# Patient Record
Sex: FEMALE | Race: WHITE | NOT HISPANIC OR LATINO | URBAN - METROPOLITAN AREA
[De-identification: names, ages, dates, MRNs, and addresses within clinical notes are randomized per-mention and may not be internally consistent; named-entity substitution may affect disease eponyms.]

---

## 2017-06-15 ENCOUNTER — APPOINTMENT (RX ONLY)
Dept: URBAN - METROPOLITAN AREA CLINIC 156 | Facility: CLINIC | Age: 62
Setting detail: DERMATOLOGY
End: 2017-06-15

## 2017-06-15 DIAGNOSIS — Z41.9 ENCOUNTER FOR PROCEDURE FOR PURPOSES OTHER THAN REMEDYING HEALTH STATE, UNSPECIFIED: ICD-10-CM

## 2017-06-15 PROCEDURE — ? BOTOX

## 2017-06-15 PROCEDURE — ? FILLERS

## 2017-06-15 NOTE — PROCEDURE: BOTOX
Levator Labii Superioris Units: 0
Reconstitution Date (Optional): 6/15/17
Glabellar Complex Units: 20
Expiration Date (Month Year): 9-18
Detail Level: Simple
Dilution (U/0.1 Cc): 2.5
Post-Care Instructions: Patient instructed to not lie down for 4 hours and limit physical activity for 24 hours. Patient instructed not to travel by airplane for 48 hours.
Forehead Units: 10
Lot #: [id number]
Consent: Written consent obtained. Risks include but not limited to lid/brow ptosis, bruising, swelling, diplopia, temporary effect, incomplete chemical denervation.

## 2017-06-15 NOTE — PROCEDURE: FILLERS
Temple Hollows Filler  Volume In Cc: 0
Post-Care Instructions: Patient instructed to apply ice to reduce swelling.
Topical Anesthesia?: 23% lidocaine, 7% tetracaine
Filler: Juvederm Vollure XC
Lot #: [id number]
Cheeks Filler Volume In Cc: 1
Nasolabial Folds Filler Volume In Cc: 0.5
Consent: Written consent obtained. Risks include but not limited to bruising, beading, irregular texture, ulceration, infection, allergic reaction, scar formation, incomplete augmentation, temporary nature, procedural pain.
Filler: Juvederm Voluma XC
Detail Level: Simple
Expiration Date (Month Year): 06/18
Expiration Date (Month Year): 01/19
Use Map Statement For Sites (Optional): No
Map Statment: See Attach Map for Complete Details
Lot #: [id number]

## 2017-06-16 ENCOUNTER — RX ONLY (OUTPATIENT)
Age: 62
Setting detail: RX ONLY
End: 2017-06-16

## 2017-06-16 RX ORDER — VALACYCLOVIR HYDROCHLORIDE 1 G/1
TABLET, FILM COATED ORAL
Qty: 4 | Refills: 0 | Status: ERX | COMMUNITY
Start: 2017-06-16

## 2017-06-22 ENCOUNTER — APPOINTMENT (RX ONLY)
Dept: URBAN - METROPOLITAN AREA CLINIC 156 | Facility: CLINIC | Age: 62
Setting detail: DERMATOLOGY
End: 2017-06-22

## 2017-06-22 DIAGNOSIS — Z41.9 ENCOUNTER FOR PROCEDURE FOR PURPOSES OTHER THAN REMEDYING HEALTH STATE, UNSPECIFIED: ICD-10-CM

## 2017-06-22 PROCEDURE — ? PELLEVE

## 2017-06-22 ASSESSMENT — LOCATION ZONE DERM: LOCATION ZONE: FACE

## 2017-06-22 ASSESSMENT — LOCATION SIMPLE DESCRIPTION DERM
LOCATION SIMPLE: RIGHT CHEEK
LOCATION SIMPLE: LEFT CHEEK

## 2017-06-22 ASSESSMENT — LOCATION DETAILED DESCRIPTION DERM
LOCATION DETAILED: LEFT MEDIAL MALAR CHEEK
LOCATION DETAILED: RIGHT MEDIAL MALAR CHEEK

## 2017-06-22 NOTE — PROCEDURE: PELLEVE
Consent: Written consent obtained, risks reviewed including but not limited to crusting, scabbing, blistering, scarring, darker or lighter pigmentary change, and/or incomplete removal.
Post-Procedures Photographs: Yes
Immediate Post-Procedure Findings: mild erythema, no edema
Pelleve Tip: 10mm
Treatment Number: 1
Number Of Passes: 5
Detail Level: Zone
Post-Care Instructions: I reviewed with the patient in detail post-care instructions. Patient should stay away from the sun and wear sun protection until treated areas are fully healed.
Treatment Endpoint: skin Smoothing
Generator Setting: 15
Post-Procedure Text: Vaseline and ice applied. Post care reviewed with patient.
Patient Discomfort: no

## 2017-07-26 ENCOUNTER — APPOINTMENT (RX ONLY)
Dept: URBAN - METROPOLITAN AREA CLINIC 156 | Facility: CLINIC | Age: 62
Setting detail: DERMATOLOGY
End: 2017-07-26

## 2017-07-26 DIAGNOSIS — Z41.9 ENCOUNTER FOR PROCEDURE FOR PURPOSES OTHER THAN REMEDYING HEALTH STATE, UNSPECIFIED: ICD-10-CM

## 2017-07-26 PROCEDURE — ? FILLERS

## 2018-04-05 ENCOUNTER — APPOINTMENT (RX ONLY)
Dept: URBAN - METROPOLITAN AREA CLINIC 156 | Facility: CLINIC | Age: 63
Setting detail: DERMATOLOGY
End: 2018-04-05

## 2018-04-05 DIAGNOSIS — Z41.9 ENCOUNTER FOR PROCEDURE FOR PURPOSES OTHER THAN REMEDYING HEALTH STATE, UNSPECIFIED: ICD-10-CM

## 2018-04-05 PROCEDURE — ? BOTOX

## 2018-04-05 NOTE — PROCEDURE: BOTOX
Depressor Anguli Oris Units: 0
Consent: Written consent obtained. Risks include but not limited to lid/brow ptosis, bruising, swelling, diplopia, temporary effect, incomplete chemical denervation.
Post-Care Instructions: Patient instructed to not lie down for 4 hours and limit physical activity for 24 hours. Patient instructed not to travel by airplane for 48 hours.
Lot #: [id number]
Expiration Date (Month Year): 8-20
Detail Level: Simple
Reconstitution Date (Optional): 4/2/18
Dilution (U/0.1 Cc): 2.5
Glabellar Complex Units: 25

## 2018-05-07 ENCOUNTER — APPOINTMENT (RX ONLY)
Dept: URBAN - METROPOLITAN AREA CLINIC 154 | Facility: CLINIC | Age: 63
Setting detail: DERMATOLOGY
End: 2018-05-07

## 2018-05-07 DIAGNOSIS — Z41.9 ENCOUNTER FOR PROCEDURE FOR PURPOSES OTHER THAN REMEDYING HEALTH STATE, UNSPECIFIED: ICD-10-CM

## 2018-05-07 PROCEDURE — ? FILLERS

## 2018-05-07 NOTE — PROCEDURE: FILLERS
Jawline Filler Volume In Cc: 0
Expiration Date (Month Year): 12/19
Additional Area 1 Volume In Cc: 0.5
Lot #: [id number]
Detail Level: Simple
Include Cannula Information In Note?: No
Map Statment: See Attach Map for Complete Details
Topical Anesthesia?: 23% lidocaine, 7% tetracaine
Temple Hollows Filler  Volume In Cc: 1
Filler: Bellafill
Additional Area 1 Location: mental is
Consent: Written consent obtained. Risks include but not limited to bruising, beading, irregular texture, ulceration, infection, allergic reaction, scar formation, incomplete augmentation, temporary nature, procedural pain.
Anesthesia Type: 1% lidocaine with epinephrine
Post-Care Instructions: Patient instructed to apply ice to reduce swelling.

## 2018-06-01 ENCOUNTER — APPOINTMENT (RX ONLY)
Dept: URBAN - METROPOLITAN AREA CLINIC 156 | Facility: CLINIC | Age: 63
Setting detail: DERMATOLOGY
End: 2018-06-01

## 2018-06-01 DIAGNOSIS — Z41.9 ENCOUNTER FOR PROCEDURE FOR PURPOSES OTHER THAN REMEDYING HEALTH STATE, UNSPECIFIED: ICD-10-CM

## 2018-06-01 PROCEDURE — ? FILLERS

## 2018-06-01 NOTE — PROCEDURE: FILLERS
Vermilion Lips Filler Volume In Cc: 0
Detail Level: Simple
Anesthesia Type: 1% lidocaine with epinephrine
Include Cannula Information In Note?: No
Filler: Bellafill
Topical Anesthesia?: 23% lidocaine, 7% tetracaine
Post-Care Instructions: Patient instructed to apply ice to reduce swelling.
Lot #: [id number]
Consent: Written consent obtained. Risks include but not limited to bruising, beading, irregular texture, ulceration, infection, allergic reaction, scar formation, incomplete augmentation, temporary nature, procedural pain.
Expiration Date (Month Year): 05/19
Map Statment: See Attach Map for Complete Details
Jawline Filler Volume In Cc: 1
Anesthesia Volume In Cc: 0.5

## 2018-06-14 ENCOUNTER — APPOINTMENT (RX ONLY)
Dept: URBAN - METROPOLITAN AREA CLINIC 154 | Facility: CLINIC | Age: 63
Setting detail: DERMATOLOGY
End: 2018-06-14

## 2018-06-14 DIAGNOSIS — Z41.9 ENCOUNTER FOR PROCEDURE FOR PURPOSES OTHER THAN REMEDYING HEALTH STATE, UNSPECIFIED: ICD-10-CM

## 2018-06-14 PROCEDURE — ? DYSPORT

## 2018-06-14 PROCEDURE — ? FILLERS

## 2018-06-14 NOTE — PROCEDURE: DYSPORT
Depressor Anguli Oris Units: 0
Post-Care Instructions: Patient instructed to not lie down for 4 hours and limit physical activity for 24 hours. Patient instructed not to travel by airplane for 48 hours.
Detail Level: Simple
Reconstitution Date (Optional): 6/12/18
Expiration Date (Month Year): 8/2018
Inferior Lateral Orbicularis Oculi Units: 25
Dilution (U/ 0.1cc): 7.5
Glabellar Complex Units: 75
Lot #: [id number]
Consent: Written consent obtained. Risks include but not limited to lid/brow ptosis, bruising, swelling, diplopia, temporary effect, incomplete chemical denervation.

## 2018-06-14 NOTE — PROCEDURE: FILLERS
Additional Area 1 Volume In Cc: 0
Expiration Date (Month Year): 08/19
Temple Hollows Filler  Volume In Cc: 1
Jawline Filler Volume In Cc: 2
Detail Level: Simple
Lot #: [id number]
Filler: Juvederm Volbella XC
Topical Anesthesia?: 23% lidocaine, 7% tetracaine
Lot #: [id number]
Include Cannula Information In Note?: No
Post-Care Instructions: Patient instructed to apply ice to reduce swelling.
Filler: Bellafill
Consent: Written consent obtained. Risks include but not limited to bruising, beading, irregular texture, ulceration, infection, allergic reaction, scar formation, incomplete augmentation, temporary nature, procedural pain.
Vermilion Lips Filler Volume In Cc: 0.6
Expiration Date (Month Year): 12/18
Additional Area 1 Location: 0.6 cc  injected into various acne scars
Map Statment: See Attach Map for Complete Details
Use Map Statement For Sites (Optional): Yes

## 2018-06-28 ENCOUNTER — APPOINTMENT (RX ONLY)
Dept: URBAN - METROPOLITAN AREA CLINIC 154 | Facility: CLINIC | Age: 63
Setting detail: DERMATOLOGY
End: 2018-06-28

## 2018-06-28 ENCOUNTER — APPOINTMENT (RX ONLY)
Dept: URBAN - METROPOLITAN AREA CLINIC 156 | Facility: CLINIC | Age: 63
Setting detail: DERMATOLOGY
End: 2018-06-28

## 2018-06-28 DIAGNOSIS — Z41.9 ENCOUNTER FOR PROCEDURE FOR PURPOSES OTHER THAN REMEDYING HEALTH STATE, UNSPECIFIED: ICD-10-CM

## 2018-06-28 PROCEDURE — ? HYDRAFACIAL

## 2018-06-28 ASSESSMENT — LOCATION ZONE DERM: LOCATION ZONE: FACE

## 2018-06-28 ASSESSMENT — LOCATION SIMPLE DESCRIPTION DERM: LOCATION SIMPLE: LEFT FOREHEAD

## 2018-06-28 ASSESSMENT — LOCATION DETAILED DESCRIPTION DERM: LOCATION DETAILED: LEFT MEDIAL FOREHEAD

## 2018-06-28 NOTE — PROCEDURE: HYDRAFACIAL
Vacuum Pressure: 14
Detail Level: Zone
Additional Vacuum Pressure (Won't Render If 0): 0
Indication: skin texture
Post-Care Instructions: I reviewed with the patient in detail post-care instructions. Patient should stay away from the sun and wear sun protection until treated areas are fully healed.
Number Of Passes: 1
Glycolic Acid %: 7.5%
Consent: Written consent obtained, risks reviewed including but not limited to crusting, scabbing, blistering, scarring, darker or lighter pigmentary change, bruising, and/or incomplete response.

## 2018-08-31 ENCOUNTER — APPOINTMENT (RX ONLY)
Dept: URBAN - METROPOLITAN AREA CLINIC 156 | Facility: CLINIC | Age: 63
Setting detail: DERMATOLOGY
End: 2018-08-31

## 2018-08-31 DIAGNOSIS — Z41.9 ENCOUNTER FOR PROCEDURE FOR PURPOSES OTHER THAN REMEDYING HEALTH STATE, UNSPECIFIED: ICD-10-CM

## 2018-08-31 PROCEDURE — ? BOTOX

## 2018-08-31 PROCEDURE — ? FILLERS

## 2018-08-31 NOTE — PROCEDURE: BOTOX
Inferior Lateral Orbicularis Oculi Units: 15
Levator Labii Superioris Units: 0
Post-Care Instructions: Patient instructed to not lie down for 4 hours and limit physical activity for 24 hours. Patient instructed not to travel by airplane for 48 hours.
Consent: Written consent obtained. Risks include but not limited to lid/brow ptosis, bruising, swelling, diplopia, temporary effect, incomplete chemical denervation.
Dilution (U/0.1 Cc): 2.5
Forehead Units: 5
Reconstitution Date (Optional): 8/28/18
Detail Level: Simple
Expiration Date (Month Year): 8-20
Lot #: [id number]

## 2018-08-31 NOTE — PROCEDURE: FILLERS
Additional Area 5 Volume In Cc: 0
Anesthesia Type: 1% lidocaine with epinephrine
Cheeks Filler Volume In Cc: 1
Use Map Statement For Sites (Optional): No
Filler: Juvederm Voluma XC
Lot #: [id number]
Detail Level: Simple
Map Statment: See Attach Map for Complete Details
Consent: Written consent obtained. Risks include but not limited to bruising, beading, irregular texture, ulceration, infection, allergic reaction, scar formation, incomplete augmentation, temporary nature, procedural pain.
Post-Care Instructions: Patient instructed to apply ice to reduce swelling.
Expiration Date (Month Year): 09/19
Anesthesia Volume In Cc: 0.5
Topical Anesthesia?: 23% lidocaine, 7% tetracaine

## 2018-09-13 ENCOUNTER — APPOINTMENT (RX ONLY)
Dept: URBAN - METROPOLITAN AREA CLINIC 156 | Facility: CLINIC | Age: 63
Setting detail: DERMATOLOGY
End: 2018-09-13

## 2018-09-13 DIAGNOSIS — Z41.9 ENCOUNTER FOR PROCEDURE FOR PURPOSES OTHER THAN REMEDYING HEALTH STATE, UNSPECIFIED: ICD-10-CM

## 2018-09-13 PROCEDURE — ? DERMAPLANE

## 2018-09-13 ASSESSMENT — LOCATION ZONE DERM: LOCATION ZONE: FACE

## 2018-09-13 ASSESSMENT — LOCATION SIMPLE DESCRIPTION DERM: LOCATION SIMPLE: RIGHT FOREHEAD

## 2018-09-13 ASSESSMENT — LOCATION DETAILED DESCRIPTION DERM: LOCATION DETAILED: RIGHT MEDIAL FOREHEAD

## 2018-09-13 NOTE — PROCEDURE: DERMAPLANE
Treatment Area Prep: alcohol
Comments: Gly lactic peel. Rejuvenating mask. TNS ceramide. UV daily tinted
Detail Level: Generalized
Pre-Procedure Text: The patient was placed in a recumbant position on the procedure table. Hydracool applied post procedure
Post-Care Instructions: I reviewed with the patient in detail post-care instructions.
Treatment Areas: face and neck
Blade: 10 blade scalpel
Post-Procedure Instructions: Following the dermaplane procedure, Oxymist treatment was applied to the treatment areas. Moisturizer and SPF was applied.

## 2019-05-02 ENCOUNTER — APPOINTMENT (RX ONLY)
Dept: URBAN - METROPOLITAN AREA CLINIC 154 | Facility: CLINIC | Age: 64
Setting detail: DERMATOLOGY
End: 2019-05-02

## 2019-05-02 ENCOUNTER — APPOINTMENT (RX ONLY)
Dept: URBAN - METROPOLITAN AREA CLINIC 156 | Facility: CLINIC | Age: 64
Setting detail: DERMATOLOGY
End: 2019-05-02

## 2019-05-02 ENCOUNTER — RX ONLY (OUTPATIENT)
Age: 64
Setting detail: RX ONLY
End: 2019-05-02

## 2019-05-02 DIAGNOSIS — Z41.9 ENCOUNTER FOR PROCEDURE FOR PURPOSES OTHER THAN REMEDYING HEALTH STATE, UNSPECIFIED: ICD-10-CM

## 2019-05-02 PROCEDURE — ? CHEMICAL PEEL

## 2019-05-02 PROCEDURE — ? BOTOX

## 2019-05-02 PROCEDURE — ? FILLERS

## 2019-05-02 RX ORDER — VALACYCLOVIR HYDROCHLORIDE 1 G/1
TABLET, FILM COATED ORAL
Qty: 4 | Refills: 0 | Status: ERX

## 2019-05-02 ASSESSMENT — LOCATION ZONE DERM: LOCATION ZONE: FACE

## 2019-05-02 ASSESSMENT — LOCATION SIMPLE DESCRIPTION DERM: LOCATION SIMPLE: RIGHT FOREHEAD

## 2019-05-02 ASSESSMENT — LOCATION DETAILED DESCRIPTION DERM: LOCATION DETAILED: RIGHT MEDIAL FOREHEAD

## 2019-05-02 NOTE — PROCEDURE: FILLERS
Additional Area 2 Volume In Cc: 0
Anesthesia Type: 1% lidocaine with epinephrine
Anesthesia Volume In Cc: 0.5
Temple Hollows Filler  Volume In Cc: 2
Consent: Written consent obtained. Risks include but not limited to bruising, beading, irregular texture, ulceration, infection, allergic reaction, scar formation, incomplete augmentation, temporary nature, procedural pain.
Post-Care Instructions: Patient instructed to apply ice to reduce swelling.
Topical Anesthesia?: 23% lidocaine, 7% tetracaine
Filler: Sculptra
Include Cannula Information In Note?: No
Detail Level: Simple
Map Statment: See Attach Map for Complete Details

## 2019-05-02 NOTE — PROCEDURE: CHEMICAL PEEL
Comments: Massage, calming mask. HA 5.
Chemical Peel: Gel Peel GL
Treatment Number: 0
Consent: Prior to the procedure, written consent was obtained and risks were reviewed, including but not limited to: redness, peeling, blistering, pigmentary change, scarring, infection, and pain.
Post-Care Instructions: I reviewed with the patient in detail post-care instructions. Patient should avoid sun exposure and wear sun protection.
Treatment Time (Optional): Peel left for 10 minutes
Number Of Layers: 2
Prep: Peel prep pad
Post Peel Care: Post care kit
Detail Level: Zone

## 2019-05-02 NOTE — PROCEDURE: BOTOX
Dilution (U/0.1 Cc): 2.5
Post-Care Instructions: Patient instructed to not lie down for 4 hours and limit physical activity for 24 hours. Patient instructed not to travel by airplane for 48 hours.
Additional Area 6 Units: 0
Expiration Date (Month Year): 8-20
Reconstitution Date (Optional): 8/28/18
Detail Level: Simple
Glabellar Complex Units: 15
Lot #: [id number]
Consent: Written consent obtained. Risks include but not limited to lid/brow ptosis, bruising, swelling, diplopia, temporary effect, incomplete chemical denervation.
Forehead Units: 5

## 2020-03-05 ENCOUNTER — APPOINTMENT (RX ONLY)
Dept: URBAN - METROPOLITAN AREA CLINIC 156 | Facility: CLINIC | Age: 65
Setting detail: DERMATOLOGY
End: 2020-03-05

## 2020-03-05 DIAGNOSIS — Z41.9 ENCOUNTER FOR PROCEDURE FOR PURPOSES OTHER THAN REMEDYING HEALTH STATE, UNSPECIFIED: ICD-10-CM

## 2020-03-05 PROCEDURE — ? CHEMICAL PEEL

## 2020-03-05 ASSESSMENT — LOCATION DETAILED DESCRIPTION DERM: LOCATION DETAILED: LEFT MEDIAL FOREHEAD

## 2020-03-05 ASSESSMENT — LOCATION SIMPLE DESCRIPTION DERM: LOCATION SIMPLE: LEFT FOREHEAD

## 2020-03-05 ASSESSMENT — LOCATION ZONE DERM: LOCATION ZONE: FACE

## 2020-03-05 NOTE — PROCEDURE: CHEMICAL PEEL
Treatment Number: 0
Consent: Prior to the procedure, written consent was obtained and risks were reviewed, including but not limited to: redness, peeling, blistering, pigmentary change, scarring, infection, and pain.
Detail Level: Zone
Prep: Peel prep pad
Treatment Time (Optional): Peel left for 5 minutes
Number Of Layers: 1
Chemical Peel: Gel Peel GL
Post-Care Instructions: I reviewed with the patient in detail post-care instructions. Patient should avoid sun exposure and wear sun protection.
Comments: SKin better 30% glycolic , soothing mask. HA 5

## 2020-05-20 ENCOUNTER — APPOINTMENT (RX ONLY)
Dept: URBAN - METROPOLITAN AREA CLINIC 154 | Facility: CLINIC | Age: 65
Setting detail: DERMATOLOGY
End: 2020-05-20

## 2020-05-20 DIAGNOSIS — Z41.9 ENCOUNTER FOR PROCEDURE FOR PURPOSES OTHER THAN REMEDYING HEALTH STATE, UNSPECIFIED: ICD-10-CM

## 2020-05-20 PROCEDURE — ? JEUVEAU

## 2020-05-20 PROCEDURE — ? FILLERS

## 2020-05-20 NOTE — PROCEDURE: FILLERS
Additional Area 3 Volume In Cc: 0
Consent: Written consent obtained. Risks include but not limited to bruising, beading, irregular texture, ulceration, infection, allergic reaction, scar formation, incomplete augmentation, temporary nature, procedural pain.
Cheeks Filler Volume In Cc: 2
Post-Care Instructions: Patient instructed to apply ice to reduce swelling.
Lot #: [id number]
Include Cannula Information In Note?: No
Expiration Date (Month Year): 1/22
Map Statment: See Attach Map for Complete Details
Anesthesia Type: 1% lidocaine with epinephrine
Anesthesia Volume In Cc: 0.5
Topical Anesthesia?: 23% lidocaine, 7% tetracaine
Filler: Kathy Loving
Detail Level: Simple

## 2020-05-20 NOTE — PROCEDURE: JEUVEAU
L Brow Units: 0
Show Additional Area 3: Yes
Show Right And Left Brow Units: No
Glabellar Complex Units: 15
Post-Care Instructions: Patient instructed to not lie down for 4 hours and limit physical activity for 24 hours.
Detail Level: Detailed
Expiration Date (Month Year): 4/22
Lot #: [id number]
Inferior Lateral Orbicularis Oculi Units: 10
Dilution (U/0.1 Cc): 4
Consent: Written consent obtained. Risks include but not limited to lid/brow ptosis, bruising, swelling, diplopia, temporary effect, incomplete chemical denervation.

## 2020-09-24 ENCOUNTER — RX ONLY (OUTPATIENT)
Age: 65
Setting detail: RX ONLY
End: 2020-09-24

## 2020-09-24 RX ORDER — MINOCYCLINE HYDROCHLORIDE 100 MG/1
CAPSULE ORAL
Qty: 28 | Refills: 0 | Status: ERX | COMMUNITY
Start: 2020-09-24

## 2020-09-24 RX ORDER — ACYCLOVIR 400 MG/1
TABLET ORAL
Qty: 14 | Refills: 0 | Status: ERX | COMMUNITY
Start: 2020-09-24

## 2022-05-09 ENCOUNTER — RX ONLY (OUTPATIENT)
Age: 67
Setting detail: RX ONLY
End: 2022-05-09

## 2022-05-09 RX ORDER — TRETIONIN 0.5 MG/G
CREAM TOPICAL
Qty: 45 | Refills: 11 | Status: ERX | COMMUNITY
Start: 2022-05-09

## 2022-06-09 ENCOUNTER — RX ONLY (OUTPATIENT)
Age: 67
Setting detail: RX ONLY
End: 2022-06-09

## 2022-06-09 RX ORDER — TRETIONIN 1 MG/G
CREAM TOPICAL
Qty: 45 | Refills: 2 | Status: ERX | COMMUNITY
Start: 2022-06-09

## 2023-01-18 ENCOUNTER — APPOINTMENT (RX ONLY)
Dept: URBAN - METROPOLITAN AREA CLINIC 154 | Facility: CLINIC | Age: 68
Setting detail: DERMATOLOGY
End: 2023-01-18

## 2023-01-18 ENCOUNTER — RX ONLY (OUTPATIENT)
Age: 68
Setting detail: RX ONLY
End: 2023-01-18

## 2023-01-18 DIAGNOSIS — Z41.9 ENCOUNTER FOR PROCEDURE FOR PURPOSES OTHER THAN REMEDYING HEALTH STATE, UNSPECIFIED: ICD-10-CM

## 2023-01-18 PROCEDURE — ? MICRONEEDLING

## 2023-01-18 RX ORDER — ACYCLOVIR 400 MG/1
TABLET ORAL
Qty: 14 | Refills: 1 | Status: ERX

## 2023-01-18 NOTE — PROCEDURE: MICRONEEDLING
Post-Care Instructions: After the procedure, take precautions agains sun exposure. Do not apply sunscreen for 12 hours after the procedure. Do not apply make-up for 12 hours after the procedure. Avoid alcohol based toners for 10-14 days. After 2-3 days patients can return to their regular skin regimen.
Treatment Number (Optional): 0
Depth In Mm: 0.5
Infusions (Optional): PRP
Depth In Mm: 1
Location #2: cheeks
Topical Anesthesia?: 23% lidocaine, 7% tetracaine
Location #1: herminia
Depth In Mm: 0.25
Depth In Mm: 1.5
Consent: Written consent obtained, risks reviewed including but not limited to pain, scarring, infection and incomplete improvement.  Patient understands the procedure is cosmetic in nature and will require out of pocket payment.
Location #3: mouth
Detail Level: Generalized

## 2023-03-28 ENCOUNTER — APPOINTMENT (RX ONLY)
Dept: URBAN - METROPOLITAN AREA CLINIC 154 | Facility: CLINIC | Age: 68
Setting detail: DERMATOLOGY
End: 2023-03-28

## 2023-03-28 DIAGNOSIS — Z41.9 ENCOUNTER FOR PROCEDURE FOR PURPOSES OTHER THAN REMEDYING HEALTH STATE, UNSPECIFIED: ICD-10-CM

## 2023-03-28 PROCEDURE — ? CHEMICAL PEEL

## 2023-03-28 NOTE — PROCEDURE: CHEMICAL PEEL
Treatment Number: 0
Post-Care Instructions: I reviewed with the patient in detail post-care instructions. Patient should avoid sun exposure and wear sun protection.
Prep: Peel prep pad
Detail Level: Zone
Chemical Peel: Skin Medica Vitalize
Number Of Layers: 4
Consent: Prior to the procedure, written consent was obtained and risks were reviewed, including but not limited to: redness, peeling, blistering, pigmentary change, scarring, infection, and pain.
Treatment Time (Optional): 6 hours
Post Peel Care: Post care kit

## 2023-07-24 ENCOUNTER — APPOINTMENT (RX ONLY)
Dept: URBAN - METROPOLITAN AREA CLINIC 156 | Facility: CLINIC | Age: 68
Setting detail: DERMATOLOGY
End: 2023-07-24

## 2023-07-24 VITALS — WEIGHT: 142 LBS | HEIGHT: 60 IN

## 2023-07-24 DIAGNOSIS — L82.0 INFLAMED SEBORRHEIC KERATOSIS: ICD-10-CM | Status: INADEQUATELY CONTROLLED

## 2023-07-24 DIAGNOSIS — L57.0 ACTINIC KERATOSIS: ICD-10-CM | Status: INADEQUATELY CONTROLLED

## 2023-07-24 DIAGNOSIS — L91.8 OTHER HYPERTROPHIC DISORDERS OF THE SKIN: ICD-10-CM | Status: INADEQUATELY CONTROLLED

## 2023-07-24 DIAGNOSIS — L57.8 OTHER SKIN CHANGES DUE TO CHRONIC EXPOSURE TO NONIONIZING RADIATION: ICD-10-CM | Status: STABLE

## 2023-07-24 DIAGNOSIS — D22 MELANOCYTIC NEVI: ICD-10-CM | Status: STABLE

## 2023-07-24 PROBLEM — D22.5 MELANOCYTIC NEVI OF TRUNK: Status: ACTIVE | Noted: 2023-07-24

## 2023-07-24 PROBLEM — D48.5 NEOPLASM OF UNCERTAIN BEHAVIOR OF SKIN: Status: ACTIVE | Noted: 2023-07-24

## 2023-07-24 PROCEDURE — ? COUNSELING

## 2023-07-24 PROCEDURE — 17110 DESTRUCTION B9 LES UP TO 14: CPT

## 2023-07-24 PROCEDURE — ? LIQUID NITROGEN (COSMETIC)

## 2023-07-24 PROCEDURE — 11102 TANGNTL BX SKIN SINGLE LES: CPT | Mod: 59

## 2023-07-24 PROCEDURE — 99213 OFFICE O/P EST LOW 20 MIN: CPT | Mod: 25

## 2023-07-24 PROCEDURE — 17000 DESTRUCT PREMALG LESION: CPT | Mod: 59

## 2023-07-24 PROCEDURE — ? PHOTO-DOCUMENTATION

## 2023-07-24 PROCEDURE — ? SNIP BIOPSY

## 2023-07-24 PROCEDURE — ? LIQUID NITROGEN

## 2023-07-24 ASSESSMENT — LOCATION DETAILED DESCRIPTION DERM
LOCATION DETAILED: LEFT PROXIMAL DORSAL FOREARM
LOCATION DETAILED: RIGHT ANTERIOR DISTAL UPPER ARM
LOCATION DETAILED: LEFT AXILLARY VAULT
LOCATION DETAILED: RIGHT SUPERIOR CENTRAL MALAR CHEEK
LOCATION DETAILED: LEFT NASAL ALA
LOCATION DETAILED: INFERIOR MID FOREHEAD
LOCATION DETAILED: RIGHT DISTAL DORSAL FOREARM
LOCATION DETAILED: SUPERIOR LUMBAR SPINE

## 2023-07-24 ASSESSMENT — LOCATION SIMPLE DESCRIPTION DERM
LOCATION SIMPLE: LEFT AXILLARY VAULT
LOCATION SIMPLE: RIGHT CHEEK
LOCATION SIMPLE: INFERIOR FOREHEAD
LOCATION SIMPLE: RIGHT FOREARM
LOCATION SIMPLE: LEFT NOSE
LOCATION SIMPLE: RIGHT UPPER ARM
LOCATION SIMPLE: LOWER BACK
LOCATION SIMPLE: LEFT FOREARM

## 2023-07-24 ASSESSMENT — LOCATION ZONE DERM
LOCATION ZONE: TRUNK
LOCATION ZONE: AXILLAE
LOCATION ZONE: FACE
LOCATION ZONE: NOSE
LOCATION ZONE: ARM

## 2023-07-24 NOTE — PROCEDURE: SNIP BIOPSY
Detail Level: Detailed
Depth Of Biopsy: dermis
Size Of Lesion In Cm: 0.6
X Size Of Lesion In Cm: 0
Biopsy Type: H and E
Biopsy Method: scissors
Anesthesia Type: 1% lidocaine with epinephrine
Anesthesia Volume In Cc (Will Not Render If 0): 0.5
Hemostasis: Drysol
Wound Care: Petrolatum
Dressing: bandage
Destruction After The Procedure: No
Type Of Destruction Used: Curettage
Curettage Text: The wound bed was treated with curettage after the biopsy was performed.
Cryotherapy Text: The wound bed was treated with cryotherapy after the biopsy was performed.
Electrodesiccation Text: The wound bed was treated with electrodesiccation after the biopsy was performed.
Electrodesiccation And Curettage Text: The wound bed was treated with electrodesiccation and curettage after the biopsy was performed.
Silver Nitrate Text: The wound bed was treated with silver nitrate after the biopsy was performed.
Lab: [id number]
Lab Facility: [id number]
Consent: Written consent was obtained and risks were reviewed including but not limited to scarring, infection, bleeding, scabbing, incomplete removal, nerve damage and allergy to anesthesia.
Render Post-Care Instructions In Note?: yes
Post-Care Instructions: I reviewed with the patient in detail post-care instructions. Patient is to keep the biopsy site dry overnight, and then apply bacitracin twice daily until healed. Patient may apply hydrogen peroxide soaks to remove any crusting.
Notification Instructions: Patient will be notified of biopsy results. However, patient instructed to call the office if not contacted within 2 weeks.
Billing Type: Third-Party Bill
Information: Selecting Yes will display possible errors in your note based on the variables you have selected. This validation is only offered as a suggestion for you. PLEASE NOTE THAT THE VALIDATION TEXT WILL BE REMOVED WHEN YOU FINALIZE YOUR NOTE. IF YOU WANT TO FAX A PRELIMINARY NOTE YOU WILL NEED TO TOGGLE THIS TO 'NO' IF YOU DO NOT WANT IT IN YOUR FAXED NOTE.

## 2023-07-24 NOTE — PROCEDURE: LIQUID NITROGEN
Show Aperture Variable?: Yes
Post-Care Instructions: I reviewed with the patient in detail post-care instructions. Patient is to wear sunprotection, and avoid picking at any of the treated lesions. Pt may apply Vaseline to crusted or scabbing areas.
Detail Level: Detailed
Consent: The patient's consent was obtained including but not limited to risks of crusting, scabbing, blistering, scarring, darker or lighter pigmentary change, recurrence, incomplete removal and infection.
Duration Of Freeze Thaw-Cycle (Seconds): 0
Render Note In Bullet Format When Appropriate: No
Medical Necessity Clause: This procedure was medically necessary because the lesions that were treated were:
Spray Paint Text: The liquid nitrogen was applied to the skin utilizing a spray paint frosting technique.
Medical Necessity Information: It is in your best interest to select a reason for this procedure from the list below. All of these items fulfill various CMS LCD requirements except the new and changing color options.

## 2023-07-24 NOTE — PROCEDURE: LIQUID NITROGEN (COSMETIC)
Detail Level: Detailed
Consent: The patient's consent was obtained including but not limited to risks of crusting, scabbing, blistering, scarring, darker or lighter pigmentary change, recurrence, incomplete removal and infection. The patient understands that the procedure is cosmetic in nature and is not covered by insurance.
Spray Paint Text: The liquid nitrogen was applied to the skin utilizing a spray paint frosting technique.
Render Post-Care Instructions In Note?: yes
Spray Paint Technique: No
Post-Care Instructions: I reviewed with the patient in detail post-care instructions. Patient is to wear sunprotection, and avoid picking at any of the treated lesions. Pt may apply Vaseline to crusted or scabbing areas.
Billing Information: Bill by [employer]

## 2023-09-13 ENCOUNTER — APPOINTMENT (RX ONLY)
Dept: URBAN - METROPOLITAN AREA CLINIC 154 | Facility: CLINIC | Age: 68
Setting detail: DERMATOLOGY
End: 2023-09-13

## 2023-09-13 DIAGNOSIS — Z41.9 ENCOUNTER FOR PROCEDURE FOR PURPOSES OTHER THAN REMEDYING HEALTH STATE, UNSPECIFIED: ICD-10-CM

## 2023-09-13 PROCEDURE — ? FILLERS

## 2023-09-13 PROCEDURE — ? JEUVEAU

## 2023-09-13 NOTE — PROCEDURE: FILLERS
Map Statment: See Attach Map for Complete Details
Marionette Lines Filler  Volume In Cc: 0
Expiration Date (Month Year): 06/23
Additional Anesthesia Volume In Cc: 6
Include Cannula Information In Note?: No
Additional Area 1 Location: perioral lines/smile lines
Aspiration Statement: Aspiration was performed prior to injecting site with filler.
Filler: RHA 4
Additional Area 1 Location: PERIORAL LINES
Additional Area 1 Location: Chin
Additional Area 1 Volume In Cc: 0.1
Cheeks Filler Volume In Cc: 2
Detail Level: Detailed
Filler: RHA 2
Lot #: [id number]
Lot #: [id number]
Anesthesia Type: 1% lidocaine with epinephrine
Expiration Date (Month Year): 08/24
Expiration Date (Month Year): 08/24/23
Vermilion Lips Filler Volume In Cc: 0.5
Consent: Written consent obtained. Risks include but not limited to bruising, beading, irregular texture, ulceration, infection, allergic reaction, scar formation, incomplete augmentation, temporary nature, procedural pain.
Lot #: [id number]
Marionette Lines Filler  Volume In Cc: 0.4
Post-Care Instructions: Patient instructed to apply ice to reduce swelling.

## 2023-09-13 NOTE — PROCEDURE: JEUVEAU
L Brow Units: 0
Consent: Written consent obtained. Risks include but not limited to lid/brow ptosis, bruising, swelling, diplopia, temporary effect, incomplete chemical denervation.
Show Mentalis Units: No
Show Periorbital Units: Yes
Post-Care Instructions: Patient instructed to not lie down for 4 hours and limit physical activity for 24 hours.
Lot #: [id number]
Dilution (U/0.1 Cc): 4
Forehead Units: 10
Additional Area 1 Location: [facility]
Expiration Date (Month Year): 4/23
Glabellar Complex Units: 20
Inferior Lateral Orbicularis Oculi Units: 15
Depressor Anguli Oris Units: 5
Detail Level: Detailed

## 2024-04-22 ENCOUNTER — APPOINTMENT (RX ONLY)
Dept: URBAN - METROPOLITAN AREA CLINIC 154 | Facility: CLINIC | Age: 69
Setting detail: DERMATOLOGY
End: 2024-04-22

## 2024-04-22 DIAGNOSIS — Z41.9 ENCOUNTER FOR PROCEDURE FOR PURPOSES OTHER THAN REMEDYING HEALTH STATE, UNSPECIFIED: ICD-10-CM

## 2024-04-22 PROCEDURE — ? FILLERS

## 2024-04-22 PROCEDURE — ? JEUVEAU

## 2024-04-22 NOTE — PROCEDURE: FILLERS
Map Statment: See Attach Map for Complete Details
Marionette Lines Filler Volume In Cc: 0
Expiration Date (Month Year): 06/23
Additional Anesthesia Volume In Cc: 6
Include Cannula Information In Note?: No
Additional Area 1 Location: perioral lines/smile lines
Aspiration Statement: Aspiration was performed prior to injecting site with filler.
Filler: RHA 4
Additional Area 1 Location: PERIORAL LINES
Additional Area 1 Location: Chin
Additional Area 1 Volume In Cc: 0.1
Cheeks Filler Volume In Cc: 2
Detail Level: Detailed
Filler: RHA 2
Lot #: [id number]
Lot #: [id number]
Anesthesia Type: 1% lidocaine with epinephrine
Expiration Date (Month Year): 08/24
Expiration Date (Month Year): 08/24/23
Vermilion Lips Filler Volume In Cc: 0.5
Consent: Written consent obtained. Risks include but not limited to bruising, beading, irregular texture, ulceration, infection, allergic reaction, scar formation, incomplete augmentation, temporary nature, procedural pain.
Lot #: [id number]
Marionette Lines Filler Volume In Cc: 0.4
Post-Care Instructions: Patient instructed to apply ice to reduce swelling.

## 2024-04-22 NOTE — PROCEDURE: JEUVEAU
L Brow Units: 0
Consent: Written consent obtained. Risks include but not limited to lid/brow ptosis, bruising, swelling, diplopia, temporary effect, incomplete chemical denervation.
Show Mentalis Units: No
Show Periorbital Units: Yes
Post-Care Instructions: Patient instructed to not lie down for 4 hours and limit physical activity for 24 hours.
Lot #: [id number]
Dilution (U/0.1 Cc): 4
Forehead Units: 10
Additional Area 1 Location: [facility]
Expiration Date (Month Year): 4/23
Glabellar Complex Units: 20
Inferior Lateral Orbicularis Oculi Units: 15
Depressor Anguli Oris Units: 5
Detail Level: Detailed

## 2024-09-24 ENCOUNTER — RX ONLY (OUTPATIENT)
Age: 69
Setting detail: RX ONLY
End: 2024-09-24

## 2024-09-24 ENCOUNTER — APPOINTMENT (RX ONLY)
Dept: URBAN - METROPOLITAN AREA CLINIC 154 | Facility: CLINIC | Age: 69
Setting detail: DERMATOLOGY
End: 2024-09-24

## 2024-09-24 DIAGNOSIS — Z41.9 ENCOUNTER FOR PROCEDURE FOR PURPOSES OTHER THAN REMEDYING HEALTH STATE, UNSPECIFIED: ICD-10-CM

## 2024-09-24 PROCEDURE — ? JEUVEAU

## 2024-09-24 PROCEDURE — ? FILLERS

## 2024-09-24 RX ORDER — ACYCLOVIR 400 MG/1
TABLET ORAL
Qty: 14 | Refills: 1 | Status: ERX

## 2024-09-24 NOTE — PROCEDURE: FILLERS
Cheeks Filler Volume In Cc: 0
Lot #: [id number]
Include Cannula Information In Note?: No
Consent: Written consent obtained. Risks include but not limited to bruising, beading, irregular texture, ulceration, infection, allergic reaction, scar formation, incomplete augmentation, temporary nature, procedural pain.
Expiration Date (Month Year): 2/16/26
Anesthesia Type: 1% lidocaine with epinephrine
Post-Care Instructions: Patient instructed to apply ice to reduce swelling.
Include Cannula Information In Note?: Yes
Vermilion Lips Filler Volume In Cc: 0.5
Lot #: [id number]
Expiration Date (Month Year): 5/1/27
Additional Area 1 Location: preauricular
Include Cannula Size?: 25G
Detail Level: Simple
Filler: RHA 4
Cheeks Filler Volume In Cc: 2
Map Statment: See Attach Map for Complete Details
Filler: RHA 2
Aspiration Statement: Aspiration was performed prior to injecting site with filler.

## 2024-09-24 NOTE — PROCEDURE: JEUVEAU
Anterior Platysmal Bands Units: 0
Show Periorbital Units: Yes
Expiration Date (Month Year): 10/25
Show Right And Left Brow Units: No
Dilution (U/0.1 Cc): 4
Detail Level: Detailed
Inferior Lateral Orbicularis Oculi Units: 15
Glabellar Complex Units: 20
Post-Care Instructions: Patient instructed to not lie down for 4 hours and limit physical activity for 24 hours.
Forehead Units: 12
Consent: Written consent obtained. Risks include but not limited to lid/brow ptosis, bruising, swelling, diplopia, temporary effect, incomplete chemical denervation.